# Patient Record
Sex: MALE | Race: AMERICAN INDIAN OR ALASKA NATIVE
[De-identification: names, ages, dates, MRNs, and addresses within clinical notes are randomized per-mention and may not be internally consistent; named-entity substitution may affect disease eponyms.]

---

## 2018-04-16 ENCOUNTER — HOSPITAL ENCOUNTER (EMERGENCY)
Dept: HOSPITAL 5 - ED | Age: 38
Discharge: HOME | End: 2018-04-16
Payer: COMMERCIAL

## 2018-04-16 VITALS — DIASTOLIC BLOOD PRESSURE: 67 MMHG | SYSTOLIC BLOOD PRESSURE: 162 MMHG
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
## 2018-04-16 DIAGNOSIS — S86.912A: Primary | ICD-10-CM
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
## 2018-04-16 NOTE — EMERGENCY DEPARTMENT REPORT
Blank Doc





- Documentation


Documentation: 





38-year-old male past medical history hypertension (noncompliant with 

medications for 2 years) fell 2 weeks ago striking both knees.  Since he has 

had a left knee pain and swelling.  Taking  an unknown medication 500 mg which 

has been helping with pain.  Patient does have a primary care doctor





Orders


Left knee x-ray


Patient declines pain medication at this time


Informed of elevated blood pressure and need for PMD follow-up

## 2018-04-16 NOTE — EMERGENCY DEPARTMENT REPORT
ED Lower Extremity HPI





- General


Chief Complaint: Extremity Injury, Lower


Stated Complaint: KNEE PAIN


Time Seen by Provider: 04/16/18 13:19


Source: patient


Mode of arrival: Ambulatory


Limitations: No Limitations





- History of Present Illness


Initial Comments: 





This is a 38-year-old male nontoxic, well nourished in appearance, no acute 

signs of distress presents to the ED with c/o of left knee pain x2 weeks.  

Patient stated that 2 weeks ago he was at work and fell from the truck onto the 

left knee.  Patient describes pain as aching with level of 8 out of 10.  

Patient denies decreased range of motion or joint redness.  Patient stated that 

there is bruising and slight swelling to the area.  Patient denies any head 

trauma or headache. Denies LOC, chest pain, shortness of breathe, fever, chills

, numbness, tingling, back pain, or abdominal pain. Patient denies any 

allergies. 


MD Complaint: knee injury


-: week(s) (2)


Injury: Knee: Left


Type of Injury: blunt


Place: work


Severity: mild


Severity scale (0 -10): 8


Improves With: immobilization


Worsens With: movement, palpation


Context: fall, direct blow


Associated Symptoms: swelling, able to partially bear weight, ambulatory.  

denies: snap/pop sensation, numbness, tingling, unable to bear weight





- Related Data


 Previous Rx's











 Medication  Instructions  Recorded  Last Taken  Type


 


Ibuprofen [Motrin] 600 mg PO Q8H PRN #30 tablet 04/16/18 Unknown Rx











 Allergies











Allergy/AdvReac Type Severity Reaction Status Date / Time


 


No Known Allergies Allergy   Unverified 04/16/18 11:24














ED Review of Systems


ROS: 


Stated complaint: KNEE PAIN


Other details as noted in HPI





Constitutional: denies: chills, fever


Eyes: denies: eye pain, eye discharge, vision change


ENT: denies: ear pain, throat pain


Respiratory: denies: cough, shortness of breath, wheezing


Cardiovascular: denies: chest pain, palpitations


Endocrine: no symptoms reported


Gastrointestinal: denies: abdominal pain, nausea, diarrhea


Genitourinary: denies: urgency, dysuria


Musculoskeletal: joint swelling, arthralgia.  denies: back pain


Skin: denies: rash, lesions


Neurological: denies: headache, weakness, paresthesias


Psychiatric: denies: anxiety, depression


Hematological/Lymphatic: denies: easy bleeding, easy bruising





ED Past Medical Hx





- Past Medical History


Previous Medical History?: No





- Surgical History


Past Surgical History?: No





- Social History


Smoking Status: Never Smoker


Substance Use Type: None





- Medications


Home Medications: 


 Home Medications











 Medication  Instructions  Recorded  Confirmed  Last Taken  Type


 


Ibuprofen [Motrin] 600 mg PO Q8H PRN #30 tablet 04/16/18  Unknown Rx














ED Physical Exam





- General


Limitations: No Limitations


General appearance: alert, in no apparent distress





- Head


Head exam: Present: atraumatic, normocephalic





- Eye


Eye exam: Present: normal appearance


Pupils: Present: normal accommodation





- ENT


ENT exam: Present: normal exam, mucous membranes moist





- Neck


Neck exam: Present: normal inspection, full ROM.  Absent: tenderness, 

meningismus





- Respiratory


Respiratory exam: Present: normal lung sounds bilaterally.  Absent: respiratory 

distress, wheezes, rales, rhonchi, stridor





- Cardiovascular


Cardiovascular Exam: Present: regular rate, normal rhythm, normal heart sounds.

  Absent: bradycardia, tachycardia, irregular rhythm, systolic murmur, 

diastolic murmur, rubs, gallop





- GI/Abdominal


GI/Abdominal exam: Present: soft, normal bowel sounds





- Rectal


Rectal exam: Present: deferred





- Extremities Exam


Extremities exam: Present: normal inspection, full ROM, tenderness, normal 

capillary refill, joint swelling.  Absent: pedal edema, calf tenderness





- Expanded Lower Extremity Exam


  ** Left


Hip exam: Present: normal inspection, full ROM


Upper Leg exam: Present: normal inspection, full ROM, swelling


Knee exam: Present: normal inspection, full ROM, tenderness, swelling, full 

knee extension.  Absent: abrasion, laceration, ecchymosis, deformity, crepidus, 

dislocation, erythema, effusion, pain w/ pronation/supination, posterior draw 

sign, pain/laxity with valgus, pain/laxity with varus


Lower Leg exam: Present: normal inspection, full ROM


Ankle exam: Present: normal inspection, full ROM


Foot/Toe exam: Present: normal inspection, full ROM


Neuro vascular tendon exam: Present: no vascular compromise.  Absent: pulse 

deficit, abnormal cap refill, motor deficit, sensory deficit, tendon deficit, 

extremity cold to touch, pallor, abnormal 2-point discrimination, decreased fine

/light touch, foot drop, peroneal nerve deficit, significant pain with passive 

ROM of distal joint


Gait: Positive: observed and limited by pain





- Back Exam


Back exam: Present: normal inspection, full ROM





- Neurological Exam


Neurological exam: Present: alert, oriented X3, normal gait





- Psychiatric


Psychiatric exam: Present: normal affect, normal mood





- Skin


Skin exam: Present: warm, dry, intact, normal color.  Absent: rash





ED Course





 Vital Signs











  04/16/18





  11:24


 


Temperature 98.4 F


 


Pulse Rate 81


 


Respiratory 16





Rate 


 


Blood Pressure 162/67


 


O2 Sat by Pulse 99





Oximetry 














- Reevaluation(s)


Reevaluation #1: 





04/16/18 14:46


Patient is speaking in full sentences with no signs of distress noted.





ED Lower Extremity MDM





- Medical Decision Making





This is a 38-year-old male presents with left knee sprain.  Patient is stable 

with family by me.  X-ray of left knee has been obtained and dictated by the 

radiologist.  Patient is notified of the x-ray report with no question noted by 

the patient.  Patient received rice therapy in the ED.  Patient also received 

Motrin.  Patient also received a knee immobilizer.  Patient was referred to 

Follow-up with a orthopedic doctor in 3-5 days or if symptoms worsen and 

continue return to emergency room as soon as possible.  At time of discharge, 

the patient does not seem toxic or ill in appearance.  No acute signs of 

distress noted.  Patient agrees to discharge treatment plan of care.  No 

further questions noted by the patient.


Critical care attestation.: 


If time is entered above; I have spent that time in minutes in the direct care 

of this critically ill patient, excluding procedure time.








ED Disposition


Clinical Impression: 


Strain of left knee


Qualifiers:


 Encounter type: initial encounter Qualified Code(s): S86.912A - Strain of 

unspecified muscle(s) and tendon(s) at lower leg level, left leg, initial 

encounter





Disposition: DC-01 TO HOME OR SELFCARE


Is pt being admited?: No


Does the pt Need Aspirin: No


Condition: Stable


Instructions:  Knee Pain (ED), Knee Immobilizer (ED), RICE Therapy (ED), 

Ibuprofen (By mouth)


Additional Instructions: 


Follow-up with a orthopedic doctor in 3-5 days or if symptoms worsen and 

continue return to emergency room as soon as possible.


Prescriptions: 


Ibuprofen [Motrin] 600 mg PO Q8H PRN #30 tablet


 PRN Reason: Pain


Referrals: 


ODETTE NAIR MD [Staff Physician] - 3-5 Days


[facility] [Outside] - 3-5 Days


[facility] [Outside] - 3-5 Days


Forms:  Work/School Release Form(ED)

## 2018-04-16 NOTE — XRAY REPORT
LEFT KNEE, 3 views:



History: Injury, pain, swelling



The bony architecture is intact without evidence of fracture or

dislocation. No joint pathology is appreciated. A moderate to large 

joint effusion is identified on the lateral image.



IMPRESSION:

Moderate to large joint effusion. No bony abnormality is detected. If 

internal derangement is suspected, MRI could be obtained.